# Patient Record
Sex: FEMALE | Race: WHITE | NOT HISPANIC OR LATINO | Employment: OTHER | URBAN - METROPOLITAN AREA
[De-identification: names, ages, dates, MRNs, and addresses within clinical notes are randomized per-mention and may not be internally consistent; named-entity substitution may affect disease eponyms.]

---

## 2020-05-18 ENCOUNTER — HOSPITAL ENCOUNTER (OUTPATIENT)
Dept: RADIOLOGY | Facility: MEDICAL CENTER | Age: 65
End: 2020-05-18
Attending: ORTHOPAEDIC SURGERY
Payer: MEDICARE

## 2020-05-18 VITALS — RESPIRATION RATE: 18 BRPM | OXYGEN SATURATION: 94 % | HEART RATE: 75 BPM

## 2020-05-18 DIAGNOSIS — M84.374A STRESS FRACTURE OF RIGHT FOOT, INITIAL ENCOUNTER: ICD-10-CM

## 2020-05-18 DIAGNOSIS — M25.571 RIGHT ANKLE PAIN, UNSPECIFIED CHRONICITY: ICD-10-CM

## 2020-05-18 PROCEDURE — 73721 MRI JNT OF LWR EXTRE W/O DYE: CPT | Mod: RT

## 2022-11-22 ENCOUNTER — HOSPITAL ENCOUNTER (OUTPATIENT)
Dept: RADIOLOGY | Facility: MEDICAL CENTER | Age: 67
End: 2022-11-22
Attending: EMERGENCY MEDICINE
Payer: MEDICARE

## 2022-11-22 VITALS — HEART RATE: 70 BPM | OXYGEN SATURATION: 95 %

## 2022-11-22 DIAGNOSIS — M25.562 ACUTE PAIN OF LEFT KNEE: ICD-10-CM

## 2022-11-22 PROCEDURE — 73721 MRI JNT OF LWR EXTRE W/O DYE: CPT | Mod: LT

## 2022-11-22 NOTE — PROGRESS NOTES
MRI NURSING NOTE:      [employer] rep present for pacer setting changes.  Pt denies abandoned device(s) &/or lead(s).  Pt educated when to alert MRI tech/Imaging RN. Pt vu.  Pt tolerated MRI Left knee s contrast scan well s issue(s). [employer] pacer set to AOO 70 per OLGA Horton.  HR, EKG, BP, pulse ox monitored during scan.   Pre-mri settings restored p MRI scan per [employer] rep.   Pt updated. VU.      No printed report received from OLGA Horton.

## 2024-03-20 ENCOUNTER — NON-PROVIDER VISIT (OUTPATIENT)
Dept: GENETICS | Facility: MEDICAL CENTER | Age: 69
End: 2024-03-20

## 2024-03-20 NOTE — PROGRESS NOTES
Sharif Whitehead is a 69 y.o. female here for a non-provider visit for: Lab Draws  on 3/20/2024 at 8:38 AM    Procedure Performed: Venipuncture     Anatomical site: Left Antecubital Area (AC)    Equipment used: 25 butterfly    Labs drawn: Fixational (two lavender EDTA tubes)    Ordering Provider: [employer]    Conrado By: Selma Bishop, Med Ass't